# Patient Record
Sex: MALE | Race: WHITE | Employment: OTHER | URBAN - METROPOLITAN AREA
[De-identification: names, ages, dates, MRNs, and addresses within clinical notes are randomized per-mention and may not be internally consistent; named-entity substitution may affect disease eponyms.]

---

## 2023-07-12 ENCOUNTER — HOSPITAL ENCOUNTER (EMERGENCY)
Age: 83
Discharge: HOME | End: 2023-07-12
Attending: STUDENT IN AN ORGANIZED HEALTH CARE EDUCATION/TRAINING PROGRAM
Payer: MEDICARE

## 2023-07-12 ENCOUNTER — APPOINTMENT (OUTPATIENT)
Dept: CT IMAGING | Age: 83
End: 2023-07-12
Payer: MEDICARE

## 2023-07-12 VITALS
TEMPERATURE: 97.5 F | HEART RATE: 63 BPM | HEIGHT: 72 IN | RESPIRATION RATE: 15 BRPM | BODY MASS INDEX: 24.38 KG/M2 | SYSTOLIC BLOOD PRESSURE: 125 MMHG | DIASTOLIC BLOOD PRESSURE: 78 MMHG | WEIGHT: 180 LBS | OXYGEN SATURATION: 96 %

## 2023-07-12 DIAGNOSIS — M26.622 ARTHRALGIA OF LEFT TEMPOROMANDIBULAR JOINT: Primary | ICD-10-CM

## 2023-07-12 DIAGNOSIS — M26.643 ARTHRITIS OF BOTH TEMPOROMANDIBULAR JOINTS: ICD-10-CM

## 2023-07-12 PROCEDURE — 93005 ELECTROCARDIOGRAM TRACING: CPT | Performed by: STUDENT IN AN ORGANIZED HEALTH CARE EDUCATION/TRAINING PROGRAM

## 2023-07-12 PROCEDURE — 99284 EMERGENCY DEPT VISIT MOD MDM: CPT

## 2023-07-12 PROCEDURE — 70486 CT MAXILLOFACIAL W/O DYE: CPT

## 2023-07-12 RX ORDER — FLUTICASONE PROPIONATE 50 MCG
1 SPRAY, SUSPENSION (ML) NASAL DAILY
COMMUNITY
Start: 2023-02-16

## 2023-07-12 RX ORDER — METOPROLOL SUCCINATE 25 MG/1
25 TABLET, EXTENDED RELEASE ORAL DAILY
COMMUNITY
Start: 2018-07-12

## 2023-07-12 RX ORDER — HYDROCODONE BITARTRATE AND ACETAMINOPHEN 5; 325 MG/1; MG/1
0.5 TABLET ORAL EVERY 6 HOURS PRN
Qty: 6 TABLET | Refills: 0 | Status: SHIPPED | OUTPATIENT
Start: 2023-07-12 | End: 2023-07-17

## 2023-07-12 RX ORDER — HYDROCODONE BITARTRATE AND ACETAMINOPHEN 5; 325 MG/1; MG/1
0.5 TABLET ORAL EVERY 6 HOURS PRN
COMMUNITY
Start: 2023-07-08 | End: 2023-07-12 | Stop reason: SDUPTHER

## 2023-07-12 RX ORDER — ACETAMINOPHEN 325 MG/1
650 TABLET ORAL EVERY 6 HOURS PRN
COMMUNITY

## 2023-07-12 RX ORDER — FUROSEMIDE 40 MG/1
40 TABLET ORAL DAILY
COMMUNITY
Start: 2022-11-03

## 2023-07-12 ASSESSMENT — PAIN DESCRIPTION - PAIN TYPE: TYPE: ACUTE PAIN

## 2023-07-12 ASSESSMENT — PAIN SCALES - GENERAL: PAINLEVEL_OUTOF10: 8

## 2023-07-12 ASSESSMENT — PAIN - FUNCTIONAL ASSESSMENT: PAIN_FUNCTIONAL_ASSESSMENT: 0-10

## 2023-07-12 NOTE — DISCHARGE INSTRUCTIONS
Follow-up with primary care as soon as possible  Take OTC medication as well as a prescribed medication as needed for pain. You may take ibuprofen 400 to 600 mg, every 8 hours as needed for pain. You may take Tylenol 650 mg in between ibuprofen medications as needed for pain. Return to the ED if symptoms persist or worsen or if you have any other complications such as drooling, dysarthria, dysphagia, dyspnea, or any other symptoms of concern.

## 2023-07-12 NOTE — ED NOTES
Discharge instructions and prescription given to the patient who expressed understanding of information and follow up care.       Sukhdeep Olivo RN  07/12/23 [id number]

## 2023-07-12 NOTE — ED PROVIDER NOTES
sounds. Perfusion:  intact  Respiratory:  Lungs clear to auscultation bilaterally. Respirations nonlabored. Abdominal:  Normal bowel sounds. Soft. Nontender. Non distended. No Organomegaly. No ferguson, Mcburney, Rovsing, fluctuance, shifting dullness  Back:  No CVA tenderness to palpation     Neurological:  Alert and oriented times 3. No focal neuro deficits. Psychiatric:  Appropriate      I have reviewed and interpreted all of the currently available lab results from this visit (if applicable):  No results found for this visit on 07/12/23. Radiographs (if obtained):  Radiologist's Report Reviewed:  CT FACIAL BONES WO CONTRAST   Preliminary Result   No acute abnormality of the facial bones or temporomandibular joints. Mild bilateral TMJ osteoarthritis. Normal alignment. EKG (if obtained): (All EKG's are interpreted by myself in the absence of a cardiologist)      MDM:    History source:  patient  History Limitations: none    78 y.o. male  who presented to the ED with a history of left-sided jaw pain for the past 2 weeks. Patient has been to the [facility] where he was evaluated and discharged with pain medications and primary care follow-up. Patient states that his symptoms have not resolved with ibuprofen or Norco.  Symptoms are worse when patient tries to eat or open his mouth wide. Patient had ibuprofen prior to presentation to the ED. No history of shortness of breath, fever, neck stiffness/neck pain, dysarthria, drooling, dysphagia/odynophagia, focal neurologic deficits, or headache. No history of trauma to the face, visual changes, or recent upper respiratory infections. No dental pain. Physical examination is significant for mild tenderness in the left TMJ otherwise unremarkable. Patient was able to open his mouth wide without difficulty. He has no pharyngeal erythema or exudates. No obvious signs of soft tissue infection or abscess.   No

## 2023-07-12 NOTE — ED TRIAGE NOTES
Pt arrives with complaints of left side jaw pain that started several weeks ago. Was seen and treated three days ago at [facility] ER and was started on Norco, which has not helped his pain at all. Pt complains of pain with moving his jaw and especially eating.

## 2023-07-13 LAB
EKG ATRIAL RATE: 250 BPM
EKG DIAGNOSIS: NORMAL
EKG Q-T INTERVAL: 462 MS
EKG QRS DURATION: 164 MS
EKG QTC CALCULATION (BAZETT): 465 MS
EKG R AXIS: 270 DEGREES
EKG T AXIS: 65 DEGREES
EKG VENTRICULAR RATE: 61 BPM

## 2023-07-17 ENCOUNTER — TELEPHONE (OUTPATIENT)
Dept: FAMILY MEDICINE CLINIC | Age: 83
End: 2023-07-17

## 2023-07-17 NOTE — TELEPHONE ENCOUNTER
Dr. Ruvalcaba in room to discuss test results with pt.    Patient called requesting to new patient apt unfortunately we are not taking new patients at this time as it can change in the next few months

## 2023-07-20 ENCOUNTER — TELEPHONE (OUTPATIENT)
Dept: FAMILY MEDICINE CLINIC | Age: 83
End: 2023-07-20

## 2023-07-20 NOTE — TELEPHONE ENCOUNTER
Called patient back   His memory is not great. Unfortunately we are not taking NEW patients at this time. Pt understood. ----- Message from April Kasey sent at 7/14/2023 12:26 PM EDT -----  Subject: Appointment Request    Reason for Call: New Patient/New to Provider Appointment needed: Routine   ED Follow Up Visit    QUESTIONS    Reason for appointment request? Requested Provider unavailable - Dr. Elizabeth Ely     Additional Information for Provider? Patient would like to follow up after   an emergency room visit on 7/12 and establish care with dr. Stan Khan. no   appointments when I searched. patient was told to follow up as soon as   possible. please call patient back to advise if she will accept patient   and he can be seen.  thank you  ---------------------------------------------------------------------------  --------------  Lang ZIMMERMAN  [phone]; OK to leave message on voicemail  ---------------------------------------------------------------------------  --------------  SCRIPT ANSWERS